# Patient Record
Sex: FEMALE | Race: WHITE
[De-identification: names, ages, dates, MRNs, and addresses within clinical notes are randomized per-mention and may not be internally consistent; named-entity substitution may affect disease eponyms.]

---

## 2019-09-19 ENCOUNTER — HOSPITAL ENCOUNTER (EMERGENCY)
Dept: HOSPITAL 95 - ER | Age: 79
Discharge: HOME | End: 2019-09-19
Payer: MEDICARE

## 2019-09-19 VITALS — BODY MASS INDEX: 37.76 KG/M2 | HEIGHT: 61 IN | WEIGHT: 200 LBS

## 2019-09-19 DIAGNOSIS — E86.0: ICD-10-CM

## 2019-09-19 DIAGNOSIS — K52.9: ICD-10-CM

## 2019-09-19 DIAGNOSIS — Z79.891: ICD-10-CM

## 2019-09-19 DIAGNOSIS — I95.9: ICD-10-CM

## 2019-09-19 DIAGNOSIS — Z88.8: ICD-10-CM

## 2019-09-19 DIAGNOSIS — Z79.899: ICD-10-CM

## 2019-09-19 DIAGNOSIS — N39.0: ICD-10-CM

## 2019-09-19 DIAGNOSIS — K21.9: ICD-10-CM

## 2019-09-19 DIAGNOSIS — Z79.52: ICD-10-CM

## 2019-09-19 DIAGNOSIS — E87.1: ICD-10-CM

## 2019-09-19 DIAGNOSIS — J44.9: ICD-10-CM

## 2019-09-19 DIAGNOSIS — E87.6: Primary | ICD-10-CM

## 2019-09-19 LAB
ALBUMIN SERPL BCP-MCNC: 2.5 G/DL (ref 3.4–5)
ALBUMIN/GLOB SERPL: 0.7 {RATIO} (ref 0.8–1.8)
ALT SERPL W P-5'-P-CCNC: 10 U/L (ref 12–78)
ANION GAP SERPL CALCULATED.4IONS-SCNC: 7 MMOL/L (ref 6–16)
AST SERPL W P-5'-P-CCNC: 20 U/L (ref 12–37)
BASOPHILS # BLD AUTO: 0.02 K/MM3 (ref 0–0.23)
BASOPHILS NFR BLD AUTO: 0 % (ref 0–2)
BILIRUB SERPL-MCNC: 0.9 MG/DL (ref 0.1–1)
BILIRUB UR QL STRIP: (no result)
BUN SERPL-MCNC: 32 MG/DL (ref 8–24)
CALCIUM SERPL-MCNC: 8.1 MG/DL (ref 8.5–10.1)
CHLORIDE SERPL-SCNC: 102 MMOL/L (ref 98–108)
CO2 SERPL-SCNC: 22 MMOL/L (ref 21–32)
CREAT SERPL-MCNC: 1.44 MG/DL (ref 0.4–1)
DEPRECATED RDW RBC AUTO: 47.8 FL (ref 35.1–46.3)
EOSINOPHIL # BLD AUTO: 0 K/MM3 (ref 0–0.68)
EOSINOPHIL NFR BLD AUTO: 0 % (ref 0–6)
ERYTHROCYTE [DISTWIDTH] IN BLOOD BY AUTOMATED COUNT: 14.5 % (ref 11.7–14.2)
GLOBULIN SER CALC-MCNC: 3.4 G/DL (ref 2.2–4)
GLUCOSE SERPL-MCNC: 85 MG/DL (ref 70–99)
HCT VFR BLD AUTO: 41.6 % (ref 33–51)
HGB BLD-MCNC: 13.9 G/DL (ref 11.5–16)
IMM GRANULOCYTES # BLD AUTO: 0.07 K/MM3 (ref 0–0.1)
IMM GRANULOCYTES NFR BLD AUTO: 1 % (ref 0–1)
KETONES UR STRIP-MCNC: (no result) MG/DL
LEUKOCYTE ESTERASE UR QL STRIP: (no result)
LYMPHOCYTES # BLD AUTO: 0.47 K/MM3 (ref 0.84–5.2)
LYMPHOCYTES NFR BLD AUTO: 5 % (ref 21–46)
MCHC RBC AUTO-ENTMCNC: 33.4 G/DL (ref 31.5–36.5)
MCV RBC AUTO: 90 FL (ref 80–100)
MONOCYTES # BLD AUTO: 0.37 K/MM3 (ref 0.16–1.47)
MONOCYTES NFR BLD AUTO: 4 % (ref 4–13)
NEUTROPHILS # BLD AUTO: 9.5 K/MM3 (ref 1.96–9.15)
NEUTROPHILS NFR BLD AUTO: 91 % (ref 41–73)
NRBC # BLD AUTO: 0 K/MM3 (ref 0–0.02)
NRBC BLD AUTO-RTO: 0 /100 WBC (ref 0–0.2)
PLATELET # BLD AUTO: 193 K/MM3 (ref 150–400)
POTASSIUM SERPL-SCNC: 3.3 MMOL/L (ref 3.5–5.5)
PROT SERPL-MCNC: 5.9 G/DL (ref 6.4–8.2)
PROT UR STRIP-MCNC: (no result) MG/DL
RBC #/AREA URNS HPF: (no result) /HPF (ref 0–2)
SODIUM SERPL-SCNC: 131 MMOL/L (ref 136–145)
SP GR SPEC: 1.02 (ref 1–1.02)
TROPONIN I SERPL-MCNC: <0.015 NG/ML (ref 0–0.04)
UROBILINOGEN UR STRIP-MCNC: (no result) MG/DL

## 2019-11-22 ENCOUNTER — HOSPITAL ENCOUNTER (OUTPATIENT)
Dept: HOSPITAL 95 - ORSCMMR | Age: 79
Discharge: HOME | End: 2019-11-22
Attending: SURGERY
Payer: OTHER GOVERNMENT

## 2019-11-22 VITALS — BODY MASS INDEX: 37.42 KG/M2 | WEIGHT: 198.2 LBS | HEIGHT: 60.98 IN

## 2019-11-22 DIAGNOSIS — K44.9: Primary | ICD-10-CM

## 2019-11-22 DIAGNOSIS — E66.01: ICD-10-CM

## 2019-11-22 DIAGNOSIS — Z79.899: ICD-10-CM

## 2019-11-22 DIAGNOSIS — R11.0: ICD-10-CM

## 2019-11-22 DIAGNOSIS — J44.9: ICD-10-CM

## 2019-11-22 DIAGNOSIS — I10: ICD-10-CM

## 2019-11-22 DIAGNOSIS — K21.9: ICD-10-CM

## 2019-11-22 DIAGNOSIS — Z87.891: ICD-10-CM

## 2019-11-22 DIAGNOSIS — R10.13: ICD-10-CM

## 2019-11-22 PROCEDURE — 0DB58ZX EXCISION OF ESOPHAGUS, VIA NATURAL OR ARTIFICIAL OPENING ENDOSCOPIC, DIAGNOSTIC: ICD-10-PCS | Performed by: SURGERY

## 2019-11-22 NOTE — NUR
11/22/19 1222 SUMAYA PRICE
History, Chart, Medications and Allergies reviewed before start of
procedure.3-LEAD EKG REVIEWED WITH PHYSICIAN PRIOR TO START OF
PROCEDURE.O2 VIA N/C INTACT THROUGHOUT SEDATION/PROCEDURE. See
Anesthesia record.

## 2019-11-22 NOTE — NUR
"Patient notified health insurance [employer]"out of Network- Financial cost $6,000 deductible/$0.00 met/ 50% coinsurance. Patient has decided to cancel appointment and leve Mirena IUD in place until she re visit contraception options in 2018. Verbalized understanding  " Ambulatory in Day [facility] History, Chart, Medications and Allergies reviewed
before start of procedure.Lungs clear T/O to Auscultation.  Patient confirms
NPO status and agrees with scheduled surgery.  Patient States Post-Procedure
ride home has been arranged.  Patient states colon prep results clear.

## 2020-10-26 ENCOUNTER — HOSPITAL ENCOUNTER (EMERGENCY)
Dept: HOSPITAL 95 - ER | Age: 80
LOS: 1 days | Discharge: HOME | End: 2020-10-27
Payer: OTHER GOVERNMENT

## 2020-10-26 VITALS — BODY MASS INDEX: 35.87 KG/M2 | HEIGHT: 61 IN | WEIGHT: 189.99 LBS

## 2020-10-26 DIAGNOSIS — K21.9: ICD-10-CM

## 2020-10-26 DIAGNOSIS — Z79.51: ICD-10-CM

## 2020-10-26 DIAGNOSIS — Z88.8: ICD-10-CM

## 2020-10-26 DIAGNOSIS — R00.2: ICD-10-CM

## 2020-10-26 DIAGNOSIS — J44.9: ICD-10-CM

## 2020-10-26 DIAGNOSIS — R53.1: ICD-10-CM

## 2020-10-26 DIAGNOSIS — Z91.010: ICD-10-CM

## 2020-10-26 DIAGNOSIS — R11.0: ICD-10-CM

## 2020-10-26 DIAGNOSIS — R55: ICD-10-CM

## 2020-10-26 DIAGNOSIS — Z88.6: ICD-10-CM

## 2020-10-26 DIAGNOSIS — Z79.899: ICD-10-CM

## 2020-10-26 DIAGNOSIS — R06.02: Primary | ICD-10-CM

## 2020-10-26 DIAGNOSIS — Z91.018: ICD-10-CM

## 2020-10-26 DIAGNOSIS — Z88.1: ICD-10-CM

## 2020-10-26 DIAGNOSIS — E78.5: ICD-10-CM

## 2020-10-26 LAB
ALBUMIN SERPL BCP-MCNC: 3.2 G/DL (ref 3.4–5)
ALBUMIN/GLOB SERPL: 1 {RATIO} (ref 0.8–1.8)
ALT SERPL W P-5'-P-CCNC: 42 U/L (ref 12–78)
ANION GAP SERPL CALCULATED.4IONS-SCNC: 7 MMOL/L (ref 6–16)
AST SERPL W P-5'-P-CCNC: 33 U/L (ref 12–37)
BASOPHILS # BLD AUTO: 0.04 K/MM3 (ref 0–0.23)
BASOPHILS NFR BLD AUTO: 1 % (ref 0–2)
BILIRUB SERPL-MCNC: 0.3 MG/DL (ref 0.1–1)
BUN SERPL-MCNC: 21 MG/DL (ref 8–24)
CALCIUM SERPL-MCNC: 8.5 MG/DL (ref 8.5–10.1)
CHLORIDE SERPL-SCNC: 110 MMOL/L (ref 98–108)
CO2 SERPL-SCNC: 26 MMOL/L (ref 21–32)
CREAT SERPL-MCNC: 0.72 MG/DL (ref 0.4–1)
DEPRECATED RDW RBC AUTO: 49.8 FL (ref 35.1–46.3)
EOSINOPHIL # BLD AUTO: 0.57 K/MM3 (ref 0–0.68)
EOSINOPHIL NFR BLD AUTO: 9 % (ref 0–6)
ERYTHROCYTE [DISTWIDTH] IN BLOOD BY AUTOMATED COUNT: 14.5 % (ref 11.7–14.2)
GLOBULIN SER CALC-MCNC: 3.1 G/DL (ref 2.2–4)
GLUCOSE SERPL-MCNC: 108 MG/DL (ref 70–99)
HCT VFR BLD AUTO: 46.3 % (ref 33–51)
HGB BLD-MCNC: 15 G/DL (ref 11.5–16)
IMM GRANULOCYTES # BLD AUTO: 0.03 K/MM3 (ref 0–0.1)
IMM GRANULOCYTES NFR BLD AUTO: 1 % (ref 0–1)
LYMPHOCYTES # BLD AUTO: 1.6 K/MM3 (ref 0.84–5.2)
LYMPHOCYTES NFR BLD AUTO: 25 % (ref 21–46)
MCHC RBC AUTO-ENTMCNC: 32.4 G/DL (ref 31.5–36.5)
MCV RBC AUTO: 93 FL (ref 80–100)
MONOCYTES # BLD AUTO: 0.49 K/MM3 (ref 0.16–1.47)
MONOCYTES NFR BLD AUTO: 8 % (ref 4–13)
NEUTROPHILS # BLD AUTO: 3.77 K/MM3 (ref 1.96–9.15)
NEUTROPHILS NFR BLD AUTO: 58 % (ref 41–73)
NRBC # BLD AUTO: 0 K/MM3 (ref 0–0.02)
NRBC BLD AUTO-RTO: 0 /100 WBC (ref 0–0.2)
PLATELET # BLD AUTO: 233 K/MM3 (ref 150–400)
POTASSIUM SERPL-SCNC: 4.5 MMOL/L (ref 3.5–5.5)
PROT SERPL-MCNC: 6.3 G/DL (ref 6.4–8.2)
SODIUM SERPL-SCNC: 143 MMOL/L (ref 136–145)
TROPONIN I SERPL-MCNC: <0.015 NG/ML (ref 0–0.04)